# Patient Record
Sex: FEMALE | Race: WHITE | NOT HISPANIC OR LATINO
[De-identification: names, ages, dates, MRNs, and addresses within clinical notes are randomized per-mention and may not be internally consistent; named-entity substitution may affect disease eponyms.]

---

## 2019-08-27 ENCOUNTER — RESULT REVIEW (OUTPATIENT)
Age: 24
End: 2019-08-27

## 2022-12-05 ENCOUNTER — APPOINTMENT (OUTPATIENT)
Dept: ORTHOPEDIC SURGERY | Facility: CLINIC | Age: 27
End: 2022-12-05

## 2022-12-05 VITALS — BODY MASS INDEX: 33.04 KG/M2 | WEIGHT: 175 LBS | HEIGHT: 61 IN

## 2022-12-05 DIAGNOSIS — Z78.9 OTHER SPECIFIED HEALTH STATUS: ICD-10-CM

## 2022-12-05 DIAGNOSIS — M79.646 PAIN IN UNSPECIFIED FINGER(S): ICD-10-CM

## 2022-12-05 PROCEDURE — 99203 OFFICE O/P NEW LOW 30 MIN: CPT

## 2022-12-05 NOTE — ASSESSMENT
[FreeTextEntry1] : Right ring finger retinacular cyst - reviewed radiographs and pathoanatomy. WBAT, will observe at this time.\par \par F/u prn

## 2022-12-05 NOTE — HISTORY OF PRESENT ILLNESS
[de-identified] : 27F, RHD, No PMHX presents with right hand ring finger mass at base. Reports having one there approx 15 years ago, was surgically removed and only recently came back. Admits to pain if pressure is applied. Denies numbness/tingling. Denies outside imaging/treatment.

## 2022-12-05 NOTE — IMAGING
[de-identified] : Right ring finger with no swelling, +ttp at digitopalmar crease with pea-sized mass. Able to flex and extend at MCP, PIP and DIP. Sensation intact throughout. <2sec cap refill.\par \par